# Patient Record
(demographics unavailable — no encounter records)

---

## 2024-11-23 NOTE — HISTORY OF PRESENT ILLNESS
[de-identified] : fever starting this morning, Tylenol given. cough x 1 week, worsened last night. congestion x 3 days. normal appetite. [FreeTextEntry6] : cough is worsening now with fever drinking urinating eyes teary- no redness or discharge

## 2024-11-23 NOTE — DISCUSSION/SUMMARY
[FreeTextEntry1] :  Recommend supportive care including antipyretics, fluids, nasal saline followed by nasal suction and use of humidifier. Discussed honey for cough if over age 1. Consider Mucinex for older kids. Return if symptoms worsen or persist.  Supportive care for fever or pain including Ibuprofen or acetaminophen as indicated. If fever or pain persists more than 48 hours, please return to office for recheck.  f/u 3 days

## 2024-12-20 NOTE — DISCUSSION/SUMMARY
[FreeTextEntry1] : RSV and flu positive. D/w mom to monitor for worsening symptoms since cough just started.  Clear lungs today.  For the following symptoms seek immediate medical attention: Lethargy, extreme irritability, decreased urine output, paleness or weakness. Signs and symptoms of respiratory distress include rapid breathing, tugging of skin under and in-between ribs or under the chin (retraction) or nasal flaring,

## 2024-12-20 NOTE — HISTORY OF PRESENT ILLNESS
[de-identified] : pt went to [facility] on 12/17 due to fever and not eating well, pt dx with RSV and the flu. Pt was at hospital for 4 hrs and recieved tylenol while there. Mom states pt is doing better but has a cough x 1 day.  [FreeTextEntry6] : Fever and decreased appetite began 12/17, mom brought him to ED where he tested pos for flu and RSV. He was given tylenol and dcd home. Cough started yesterday. He is afebrile, eating and drinking well.

## 2025-01-28 NOTE — PHYSICAL EXAM
[Alert] : alert [No Acute Distress] : no acute distress [Normocephalic] : normocephalic [Anterior [place] Closed] : anterior fontanelle closed [Red Reflex Bilateral] : red reflex bilateral [PERRL] : PERRL [Normally Placed Ears] : normally placed ears [Auricles Well Formed] : auricles well formed [Clear Tympanic membranes with present light reflex and bony landmarks] : clear tympanic membranes with present light reflex and bony landmarks [No Discharge] : no discharge [Nares Patent] : nares patent [Palate Intact] : palate intact [Uvula Midline] : uvula midline [Tooth Eruption] : tooth eruption  [Supple, full passive range of motion] : supple, full passive range of motion [No Palpable Masses] : no palpable masses [Symmetric Chest Rise] : symmetric chest rise [Clear to Auscultation Bilaterally] : clear to auscultation bilaterally [Regular Rate and Rhythm] : regular rate and rhythm [S1, S2 present] : S1, S2 present [No Murmurs] : no murmurs [+2 Femoral Pulses] : +2 femoral pulses [Soft] : soft [NonTender] : non tender [Non Distended] : non distended [Normoactive Bowel Sounds] : normoactive bowel sounds [No Hepatomegaly] : no hepatomegaly [No Splenomegaly] : no splenomegaly [Circumcised] : circumcised [Central Urethral Opening] : central urethral opening [Testicles Descended Bilaterally] : testicles descended bilaterally [Patent] : patent [Normally Placed] : normally placed [No Abnormal Lymph Nodes Palpated] : no abnormal lymph nodes palpated [No Clavicular Crepitus] : no clavicular crepitus [Symmetric Buttocks Creases] : symmetric buttocks creases [No Spinal Dimple] : no spinal dimple [NoTuft of Hair] : no tuft of hair [Cranial Nerves Grossly Intact] : cranial nerves grossly intact [No Rash or Lesions] : no rash or lesions

## 2025-01-28 NOTE — DISCUSSION/SUMMARY
[Normal Development] : development [None] : No known medical problems [No Elimination Concerns] : elimination [No Skin Concerns] : skin [Normal Sleep Pattern] : sleep [Family Support] : family support [Child Development and Behavior] : child development and behavior [Language Promotion/Hearing] : language promotion/hearing [Toliet Training Readiness] : toliet training readiness [Safety] : safety [Father] : father [] : The components of the vaccine(s) to be administered today are listed in the plan of care. The disease(s) for which the vaccine(s) are intended to prevent and the risks have been discussed with the caretaker.  The risks are also included in the appropriate vaccination information statements which have been provided to the patient's caregiver.  The caregiver has given consent to vaccinate. [de-identified] : discussed ways to increase calories, add butter/olive oil to foods, whole milk, offering foods more often [FreeTextEntry1] : Encourage whole cow's milk. Continue table foods, 3 meals with 2-3 snacks per day. Incorporate water daily in a sippy cup. Brush teeth twice a day with soft toothbrush. Recommend visit to dentist. When in car, keep child in rear-facing car seats until age 2, or until  the maximum height and weight for seat is reached. Put toddler to sleep in own bed or crib. Help toddler to maintain consistent daily routines and sleep schedule. Toilet training discussed. Recognize anxiety in new settings. Ensure home is safe. Be within arm's reach of toddler at all times. Use consistent, positive discipline. Read aloud to toddler.  DTap and Hep A vaccines given, Flu vaccine declined  Follow up with nutritionist  Follow up with hematology  SWYC and MCHAT reviewed- no concerns  Return at 2 years of age for WCC or sooner if any concerns

## 2025-01-28 NOTE — DEVELOPMENTAL MILESTONES
[Normal Development] : Normal Development [None] : none [Engages with others for play] : engages with others for play [Help dress and undress self] : help dress and undress self [Points to pictures in book] : points to pictures in book [Points to object of interest to] : points to object of interest to draw attention to it [Turns and looks at adult if] : turns and looks at adult if something new happens [Begins to scoop with spoon] : begins to scoop with spoon [Identifies at least 2 body parts] : identifies at least 2 body parts [Walks up with 2 feet per step] : walks up with 2 feet per step with hand held [Sits in small chair] : sits in small chair [Carries toy while walking] : carries toy while walking [Scribbles spontaneously] : scribbles spontaneously [Throws small ball a few feet] : throws a small ball a few feet while standing [Passed] : passed [Yes] : Completed. [Uses 6 to 10 words other than] : uses 6 to 10 words other than names [FreeTextEntry1] : no concerns- score=14

## 2025-01-28 NOTE — HISTORY OF PRESENT ILLNESS
[Father] : father [Normal] : Normal [In crib] : In crib [Sippy cup use] : Sippy cup use [Water heater temperature set at <120 degrees F] : Water heater temperature set at <120 degrees F [Car seat in back seat] : Car seat in back seat [Carbon Monoxide Detectors] : Carbon monoxide detectors [Smoke Detectors] : Smoke detectors [Playtime] : Playtime  [Vitamin] : Primary Fluoride Source: Vitamin [No] : Not at  exposure [Up to date] : Up to date [Exposure to electronic nicotine delivery system] : No exposure to electronic nicotine delivery system [FreeTextEntry7] : 18 month WCC (20 months at time of visit) Followed by nutrition for low weight, father states he seems to be eating more lately, better appetite Saw hematology for anemia, will occasionally take Fe supplement, giving iron rich foods, has follow up with hematology in a few weeks, will be having blood work  [de-identified] : none [de-identified] : breastfeeding, refuses whole milk, eating table foods, will eat whatever family is eating, some Pediasure

## 2025-05-28 NOTE — PHYSICAL EXAM
[NL] : no acute distress, alert [Acute Distress] : no acute distress [de-identified] : both feet, plantar aspect- entire bottom of foot with deroofed blisters, dead skin and granulation tissue, slightly tender, no signs of infection
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
## 2025-06-04 NOTE — PHYSICAL EXAM
[No Acute Distress] : no acute distress [de-identified] : Physical exam limited d/t telehealth encounter, patient observed seated with mom, appears pleasant, smiling and in nad [de-identified] : observed bilateral feet wrapped in ace wrap, toes wiggling. limited visualization due to telehealth
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
## 2025-06-09 NOTE — DEVELOPMENTAL MILESTONES
[Normal Development] : Normal Development [FreeTextEntry1] : no vision or hearing concerns [place] within normal for age.
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
